# Patient Record
Sex: FEMALE | Race: OTHER | HISPANIC OR LATINO | URBAN - METROPOLITAN AREA
[De-identification: names, ages, dates, MRNs, and addresses within clinical notes are randomized per-mention and may not be internally consistent; named-entity substitution may affect disease eponyms.]

---

## 2017-12-25 ENCOUNTER — EMERGENCY (EMERGENCY)
Facility: HOSPITAL | Age: 47
LOS: 1 days | Discharge: HOME | End: 2017-12-25
Admitting: EMERGENCY MEDICINE
Payer: COMMERCIAL

## 2017-12-25 VITALS
SYSTOLIC BLOOD PRESSURE: 130 MMHG | HEART RATE: 68 BPM | TEMPERATURE: 99 F | OXYGEN SATURATION: 100 % | RESPIRATION RATE: 16 BRPM | DIASTOLIC BLOOD PRESSURE: 82 MMHG

## 2017-12-25 PROCEDURE — 99284 EMERGENCY DEPT VISIT MOD MDM: CPT

## 2017-12-25 RX ORDER — CHLORHEXIDINE GLUCONATE 213 G/1000ML
15 SOLUTION TOPICAL
Qty: 1 | Refills: 0 | OUTPATIENT
Start: 2017-12-25

## 2017-12-25 RX ORDER — OXYCODONE AND ACETAMINOPHEN 5; 325 MG/1; MG/1
1 TABLET ORAL ONCE
Qty: 0 | Refills: 0 | Status: DISCONTINUED | OUTPATIENT
Start: 2017-12-25 | End: 2017-12-25

## 2017-12-25 RX ADMIN — OXYCODONE AND ACETAMINOPHEN 1 TABLET(S): 5; 325 TABLET ORAL at 21:00

## 2017-12-25 RX ADMIN — OXYCODONE AND ACETAMINOPHEN 1 TABLET(S): 5; 325 TABLET ORAL at 21:15

## 2017-12-25 NOTE — ED PROVIDER NOTE - PROGRESS NOTE DETAILS
NISHI Marie: Pt seen by dental, small abscess by hard palate drained, states pain likely due to periodontal disease, recommend pt continue current Augmentin (has a 10 day supply) and start Perdiex x 1 week w/ outpatient dental follow up. Pt ok with plan.

## 2017-12-25 NOTE — ED PROVIDER NOTE - MOUTH
GINGIVAL ABSCESS/GINGIVAL EDEMA/palpable fluctuance to tooth 13 GINGIVAL ABSCESS/GINGIVAL EDEMA/palpable fluctuance to hard palate adjacent to tooth 13

## 2017-12-25 NOTE — PROGRESS NOTE ADULT - SUBJECTIVE AND OBJECTIVE BOX
Patient is a 47y old  Female who presents with a chief complaint of pain and swelling UL    HPI: Patient went to doctor last week for pain. Doctor thought it was sinus related and placed patient on amoxicillin.  She has taken amox for 2 days but the pain has increased       PAST MEDICAL & SURGICAL HISTORY:  No pertinent past medical history      MEDICATIONS  (STANDING): Amox for pain, motrin for pain. ED gave 1 percocet   No Known Allergies  *SOCIAL HISTORY:Patient with    *Last Dental Visit: over 1 year ago for SRP's    Vital Signs Last 24 Hrs  T(C): 37 (25 Dec 2017 20:12), Max: 37 (25 Dec 2017 20:12)  T(F): 98.6 (25 Dec 2017 20:12), Max: 98.6 (25 Dec 2017 20:12)  HR: 68 (25 Dec 2017 20:12) (68 - 68)  BP: 130/82 (25 Dec 2017 20:12) (130/82 - 130/82)  BP(mean): --  RR: 16 (25 Dec 2017 20:12) (16 - 16)  SpO2: 100% (25 Dec 2017 20:12) (100% - 100%)    EOE:  TMJ WNL             Mandible FROM             Facial bones and MOM grossly intact             ( -  ) trismus             ( -  ) LAD             ( -  ) swelling             ( -  ) asymmetry             ( -  ) palpation            (  - ) SOB             ( -  ) dysphagia        IOE:  permanent dentition: grossly intact           hard/soft palate:  swelling at gingival margin of palate from #14 - Mesial #15           tongue/FOM - possible geographic tongue            labial/buccal mucosa 3 aphthous ulcers present in mouth           (  - ) percussion           ( +  ) palpation palpation to palatal aspect #14 and #15.Deep 10+ mm pocket between 14 and 15     DENTAL RADIOGRAPHS: PA's #12-15  ASSESSMENT: Periodontal abscess, with possible irreversible pulpitis to #14     PROCEDURE:  Verbal consent given. EOE, IOE, Rads. Admin 1 carp septo 4% with Epi [id number]. With 15 blade opened flap along gingival margin to bone. Released palatal tissue with periosteal elevator.  Purulence appreciated. Irrigated with saline. Patient rinsed with Chlorhexidine 3x.  POIG.      RECOMMENDATIONS:  1) OTC motrin for pain, continue antibiotics   2) Dental F/U with outpatient dentist for comprehensive dental care. See periodontist and general dentist for possible referral to endodontist   3) If any difficulty swallowing/breathing, fever occur, page dental.     Elham Stephens #[id number]

## 2017-12-25 NOTE — ED ADULT NURSE NOTE - OBJECTIVE STATEMENT
Pt. received into intake room 13 with c/o pain to gums/mouth x 2 days.  Told she has an abscess that needs to be drained.  Awaiting dental consult. will continue to monitor.

## 2017-12-25 NOTE — ED ADULT TRIAGE NOTE - CHIEF COMPLAINT QUOTE
c.o left sided mouth pain radiating to jaw and nose x2 days. states today she started to notice a lump inside mouth. currently on amoxicillin for sinus infection. took 600mg ibuprofen 2 hours ago. denies pmh

## 2017-12-25 NOTE — ED PROVIDER NOTE - CARE PLAN
Principal Discharge DX:	Hard palate abscess  Instructions for follow-up, activity and diet:	See your primary care doctor within 24-48 hours. See your dentist as soon as possible for further management. Continue Augmentin antibiotic 1 tab twice a day for 7 days. Use Peridex mouthwash 15ml twice a day 30 minutes before meals. Take Motrin 600mg every 8hrs with food for pain.  Percocet 1 tablets every 6 hrs as needed for intolerable pain- caution drowsiness while taking this medication- do not drive or operate heavy machinery. Return to the ER for worsening symptoms or any other concerns.

## 2017-12-25 NOTE — ED PROVIDER NOTE - OBJECTIVE STATEMENT
46 y/o F no PMHx here w/ right upper gingival swelling and pain x 2 days. States she had Augmentin at home, which she started to take when the swelling developed. Wanted to wait until after the holidays to see her dentist, however came in today due to pain. Denies fevers, chills, gingival bleeding or any other complaints.

## 2017-12-25 NOTE — ED PROVIDER NOTE - PLAN OF CARE
See your primary care doctor within 24-48 hours. See your dentist as soon as possible for further management. Continue Augmentin antibiotic 1 tab twice a day for 7 days. Use Peridex mouthwash 15ml twice a day 30 minutes before meals. Take Motrin 600mg every 8hrs with food for pain.  Percocet 1 tablets every 6 hrs as needed for intolerable pain- caution drowsiness while taking this medication- do not drive or operate heavy machinery. Return to the ER for worsening symptoms or any other concerns.